# Patient Record
Sex: MALE | Race: ASIAN | Employment: OTHER | URBAN - METROPOLITAN AREA
[De-identification: names, ages, dates, MRNs, and addresses within clinical notes are randomized per-mention and may not be internally consistent; named-entity substitution may affect disease eponyms.]

---

## 2018-04-17 ENCOUNTER — OFFICE VISIT (OUTPATIENT)
Dept: PRIMARY CARE CLINIC | Age: 43
End: 2018-04-17
Payer: COMMERCIAL

## 2018-04-17 VITALS
RESPIRATION RATE: 16 BRPM | HEART RATE: 65 BPM | WEIGHT: 247.8 LBS | BODY MASS INDEX: 38.89 KG/M2 | TEMPERATURE: 97.8 F | SYSTOLIC BLOOD PRESSURE: 120 MMHG | OXYGEN SATURATION: 95 % | HEIGHT: 67 IN | DIASTOLIC BLOOD PRESSURE: 70 MMHG

## 2018-04-17 DIAGNOSIS — Z12.5 PROSTATE CANCER SCREENING: ICD-10-CM

## 2018-04-17 DIAGNOSIS — Z20.2 EXPOSURE TO STD: ICD-10-CM

## 2018-04-17 DIAGNOSIS — R30.0 DYSURIA: Primary | ICD-10-CM

## 2018-04-17 LAB
BILIRUBIN URINE: NEGATIVE
BLOOD, URINE: NEGATIVE
CLARITY: CLEAR
COLOR: YELLOW
GLUCOSE URINE: NEGATIVE MG/DL
HBV SURFACE AB TITR SER: REACTIVE MIU/ML
HEPATITIS C ANTIBODY INTERPRETATION: NORMAL
KETONES, URINE: NEGATIVE MG/DL
LEUKOCYTE ESTERASE, URINE: NEGATIVE
NITRITE, URINE: NEGATIVE
PH UA: 6 (ref 5–9)
PROSTATE SPECIFIC ANTIGEN: 0.95 NG/ML
PROTEIN UA: NEGATIVE MG/DL
SPECIFIC GRAVITY UA: 1.02 (ref 1–1.03)
UROBILINOGEN, URINE: 0.2 E.U./DL

## 2018-04-17 PROCEDURE — 99203 OFFICE O/P NEW LOW 30 MIN: CPT | Performed by: INTERNAL MEDICINE

## 2018-04-17 RX ORDER — LOSARTAN POTASSIUM 100 MG/1
100 TABLET ORAL DAILY
COMMUNITY
Start: 2018-01-26

## 2018-04-17 RX ORDER — HYDROCHLOROTHIAZIDE 12.5 MG/1
12.5 CAPSULE, GELATIN COATED ORAL DAILY
COMMUNITY
Start: 2018-01-26

## 2018-04-17 RX ORDER — ASCORBATE CALCIUM 500 MG
500 TABLET ORAL
COMMUNITY
Start: 2018-01-15 | End: 2018-08-31

## 2018-04-17 RX ORDER — ATORVASTATIN CALCIUM 80 MG/1
80 TABLET, FILM COATED ORAL DAILY
COMMUNITY
Start: 2018-01-26

## 2018-04-17 RX ORDER — FLUCONAZOLE 100 MG/1
100 TABLET ORAL 2 TIMES DAILY
Qty: 14 TABLET | Refills: 0 | Status: SHIPPED | OUTPATIENT
Start: 2018-04-17 | End: 2018-04-24

## 2018-04-17 RX ORDER — METRONIDAZOLE 500 MG/1
TABLET ORAL
Qty: 4 TABLET | Refills: 0 | Status: SHIPPED | OUTPATIENT
Start: 2018-04-17 | End: 2018-08-31

## 2018-04-17 ASSESSMENT — PATIENT HEALTH QUESTIONNAIRE - PHQ9
SUM OF ALL RESPONSES TO PHQ9 QUESTIONS 1 & 2: 0
2. FEELING DOWN, DEPRESSED OR HOPELESS: 0
1. LITTLE INTEREST OR PLEASURE IN DOING THINGS: 0
SUM OF ALL RESPONSES TO PHQ QUESTIONS 1-9: 0

## 2018-04-18 LAB — RPR: NORMAL

## 2018-04-20 LAB
C. TRACHOMATIS DNA ,URINE: NEGATIVE
HIV-1 AND HIV-2 ANTIBODIES: NEGATIVE
N. GONORRHOEAE DNA, URINE: NEGATIVE

## 2018-04-21 ASSESSMENT — ENCOUNTER SYMPTOMS
CHOKING: 0
VOMITING: 0
VOICE CHANGE: 0
TROUBLE SWALLOWING: 0
SHORTNESS OF BREATH: 0
PHOTOPHOBIA: 0
NAUSEA: 0

## 2018-08-31 ENCOUNTER — OFFICE VISIT (OUTPATIENT)
Dept: PRIMARY CARE CLINIC | Age: 43
End: 2018-08-31
Payer: COMMERCIAL

## 2018-08-31 VITALS
DIASTOLIC BLOOD PRESSURE: 76 MMHG | BODY MASS INDEX: 39.55 KG/M2 | OXYGEN SATURATION: 97 % | SYSTOLIC BLOOD PRESSURE: 118 MMHG | RESPIRATION RATE: 16 BRPM | HEIGHT: 67 IN | WEIGHT: 252 LBS | HEART RATE: 70 BPM | TEMPERATURE: 97.9 F

## 2018-08-31 DIAGNOSIS — Z71.1 CONCERN ABOUT STD IN MALE WITHOUT DIAGNOSIS: Primary | ICD-10-CM

## 2018-08-31 DIAGNOSIS — Z71.1 CONCERN ABOUT STD IN MALE WITHOUT DIAGNOSIS: ICD-10-CM

## 2018-08-31 LAB
HEPATITIS B SURFACE ANTIGEN INTERPRETATION: NORMAL
HEPATITIS C ANTIBODY INTERPRETATION: NORMAL

## 2018-08-31 PROCEDURE — 99213 OFFICE O/P EST LOW 20 MIN: CPT | Performed by: INTERNAL MEDICINE

## 2018-08-31 RX ORDER — ATORVASTATIN CALCIUM 80 MG/1
40 TABLET, FILM COATED ORAL
COMMUNITY
Start: 2018-05-24 | End: 2018-08-31

## 2018-08-31 RX ORDER — HYDROCHLOROTHIAZIDE 12.5 MG/1
12.5 CAPSULE, GELATIN COATED ORAL
COMMUNITY
Start: 2018-05-29

## 2018-08-31 NOTE — PROGRESS NOTES
Constitutional: He appears well-developed and well-nourished. HENT:   Head: Normocephalic and atraumatic. Eyes: Pupils are equal, round, and reactive to light. Conjunctivae and EOM are normal.   Neck: Normal range of motion. Cardiovascular: Normal rate and regular rhythm. Pulmonary/Chest: Effort normal. No respiratory distress. He has no wheezes. Abdominal: Soft. Bowel sounds are normal. He exhibits no distension. There is no tenderness. Musculoskeletal: Normal range of motion. Neurological: He is alert. Skin: Skin is dry. I discuss what is the relationship like with him and his wife. Why his he getting his std check again. Assessment/Plan  Prandip was seen today for exposure to std. Diagnoses and all orders for this visit:    Concern about STD in male without diagnosis  -     C. Trachomatis / N. Gonorrhoeae, DNA; Future  -     Hiv-1,-2 W/Reflex To Hiv-1 Western Blot  -     Rpr; Future  -     Hepatitis C Antibody; Future  -     Hepatitis B Surface Antigen; Future  -     Cancel: Hsv 1 Antibody, IgG  -     Cancel: Hsv 2 Antibody, IgG    Other orders  -     HIV-1,-2 w/Reflex to HIV-1 Western Blot  -     Herpes Simplex Virus (HSV) II Glycoprotein Antibody IgG  -     Herpes Simplex Virus (HSV) I Glycoprotein Antibody IgG  -     RPR Reflex to Titer and TPPA  -     Hepatitis C Antibody  -     Hepatitis B Surface Antigen  -     C.trachomatis N.gonorrhoeae DNA        No Follow-up on file.     Mckinley Kramer MD

## 2018-09-02 LAB — RPR: NORMAL

## 2018-09-03 LAB
HIV-1 AND HIV-2 ANTIBODIES: NEGATIVE
HSV 1 GLYCOPROTEIN G AB IGG: <0.01 IV
HSV 2 GLYCOPROTEIN G AB IGG: 0.08 IV

## 2018-09-03 ASSESSMENT — ENCOUNTER SYMPTOMS
CHOKING: 0
SHORTNESS OF BREATH: 0
VOMITING: 0
VOICE CHANGE: 0
NAUSEA: 0
TROUBLE SWALLOWING: 0
PHOTOPHOBIA: 0

## 2018-09-06 LAB
C. TRACHOMATIS DNA ,URINE: NEGATIVE
N. GONORRHOEAE DNA, URINE: NEGATIVE